# Patient Record
Sex: FEMALE | Race: WHITE | HISPANIC OR LATINO | URBAN - METROPOLITAN AREA
[De-identification: names, ages, dates, MRNs, and addresses within clinical notes are randomized per-mention and may not be internally consistent; named-entity substitution may affect disease eponyms.]

---

## 2017-08-28 ENCOUNTER — FOLLOW UP ESTABLISHED (OUTPATIENT)
Dept: URBAN - METROPOLITAN AREA CLINIC 60 | Facility: CLINIC | Age: 82
End: 2017-08-28
Payer: MEDICARE

## 2017-08-28 DIAGNOSIS — H04.123 TEAR FILM INSUFFICIENCY OF BILATERAL LACRIMAL GLANDS: Primary | ICD-10-CM

## 2017-08-28 DIAGNOSIS — H44.521 ATROPHY OF GLOBE, RIGHT EYE: ICD-10-CM

## 2017-08-28 PROCEDURE — 92012 INTRM OPH EXAM EST PATIENT: CPT | Performed by: OPHTHALMOLOGY

## 2017-08-28 PROCEDURE — 83861 MICROFLUID ANALY TEARS: CPT | Performed by: OPHTHALMOLOGY

## 2017-08-28 ASSESSMENT — VISUAL ACUITY
OS: 20/CF 2'
OD: 20/NLP

## 2017-08-28 ASSESSMENT — INTRAOCULAR PRESSURE: OS: 19

## 2022-05-10 ENCOUNTER — OFFICE VISIT (OUTPATIENT)
Dept: URBAN - METROPOLITAN AREA CLINIC 60 | Facility: CLINIC | Age: 87
End: 2022-05-10
Payer: MEDICARE

## 2022-05-10 DIAGNOSIS — H00.15 CHALAZION OF LEFT LOWER EYELID: Primary | ICD-10-CM

## 2022-05-10 DIAGNOSIS — H04.122 TEAR FILM INSUFFICIENCY OF LEFT LACRIMAL GLAND: ICD-10-CM

## 2022-05-10 PROCEDURE — 99203 OFFICE O/P NEW LOW 30 MIN: CPT | Performed by: OPTOMETRIST

## 2022-05-10 RX ORDER — NEOMYCIN SULFATE, POLYMYXIN B SULFATE AND DEXAMETHASONE 3.5; 10000; 1 MG/G; [USP'U]/G; MG/G
OINTMENT OPHTHALMIC
Qty: 3.5 | Refills: 0 | Status: ACTIVE
Start: 2022-05-10

## 2022-05-10 NOTE — IMPRESSION/PLAN
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 Plan: Patient educated on findings. Per patient she has been using Prednisolone QID OS for the last 5 years (prescribed by her primary care doctor). [id number] [address]t for patient to continue with Pred.

## 2022-05-10 NOTE — IMPRESSION/PLAN
Impression: Chalazion of left lower eyelid: H00.15. Plan: Patient educated on findings. No need for surgical intervention at this time. Recommend patient perform warm compresses. Will start patient on Maxitrol ointment QHS OS, erx'd to patient's pharmacy. If symptoms do not improve, patient to call office.

## 2022-06-14 ENCOUNTER — OFFICE VISIT (OUTPATIENT)
Dept: URBAN - METROPOLITAN AREA CLINIC 60 | Facility: CLINIC | Age: 87
End: 2022-06-14
Payer: MEDICARE

## 2022-06-14 DIAGNOSIS — H35.3221 EXDTVE AGE-REL MCLR DEGN, LEFT EYE, WITH ACTV CHRDL NEOVAS: Primary | ICD-10-CM

## 2022-06-14 PROCEDURE — 99214 OFFICE O/P EST MOD 30 MIN: CPT | Performed by: OPTOMETRIST

## 2022-06-14 PROCEDURE — 92250 FUNDUS PHOTOGRAPHY W/I&R: CPT | Performed by: OPTOMETRIST

## 2022-06-14 ASSESSMENT — INTRAOCULAR PRESSURE: OS: 12

## 2022-06-14 NOTE — IMPRESSION/PLAN
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 Plan: Patient's daughter educated on findings. Photos done today. Will refer to [facility] for a evaluation.

## 2022-08-30 ENCOUNTER — OFFICE VISIT (OUTPATIENT)
Dept: URBAN - METROPOLITAN AREA CLINIC 60 | Facility: CLINIC | Age: 87
End: 2022-08-30
Payer: MEDICARE

## 2022-08-30 DIAGNOSIS — H44.521 ATROPHY OF GLOBE, RIGHT EYE: Primary | ICD-10-CM

## 2022-08-30 DIAGNOSIS — H02.052 TRICHIASIS WITHOUT ENTROPIAN RIGHT LOWER EYELID: ICD-10-CM

## 2022-08-30 PROCEDURE — 99213 OFFICE O/P EST LOW 20 MIN: CPT | Performed by: OPTOMETRIST

## 2022-08-30 PROCEDURE — 67820 REVISE EYELASHES: CPT | Performed by: OPTOMETRIST

## 2022-08-30 RX ORDER — NEOMYCIN, POLYMYXIN B SULFATES, DEXAMETHASONE 1; 3.5; 1 MG/G; MG/G; [USP'U]/G
OINTMENT OPHTHALMIC
Qty: 3.5 | Refills: 3 | Status: ACTIVE
Start: 2022-08-30

## 2022-08-30 NOTE — IMPRESSION/PLAN
Impression: Phthisis bulbi of right eye Plan: Patient and patient's daughter educated on findings. Patient to continue with Pred Forte (erx'd by primary care doctor). Patient to use Pred Forte Q2H OD for 2 days then start using as previously directed (BID OU). Will start patient on Maxitrol ointment BID OD, erx'd to patient's pharmacy. Discussed possible enucleation if OD continues to be painful.

## 2022-08-30 NOTE — IMPRESSION/PLAN
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 Plan: Patient's daughter educated on findings. Lashes epilated at slit lamp with jewelers forceps.